# Patient Record
Sex: MALE | Race: WHITE | HISPANIC OR LATINO | Employment: PART TIME | URBAN - METROPOLITAN AREA
[De-identification: names, ages, dates, MRNs, and addresses within clinical notes are randomized per-mention and may not be internally consistent; named-entity substitution may affect disease eponyms.]

---

## 2017-05-11 ENCOUNTER — HOSPITAL ENCOUNTER (EMERGENCY)
Facility: OTHER | Age: 37
Discharge: HOME | End: 2017-05-11
Attending: EMERGENCY MEDICINE

## 2017-05-11 VITALS
SYSTOLIC BLOOD PRESSURE: 118 MMHG | HEART RATE: 58 BPM | DIASTOLIC BLOOD PRESSURE: 69 MMHG | BODY MASS INDEX: 25.01 KG/M2 | WEIGHT: 165 LBS | HEIGHT: 68 IN | TEMPERATURE: 99 F | OXYGEN SATURATION: 96 % | RESPIRATION RATE: 16 BRPM

## 2017-05-11 DIAGNOSIS — G40.909 SEIZURE DISORDER: Primary | ICD-10-CM

## 2017-05-11 DIAGNOSIS — R31.9 HEMATURIA: ICD-10-CM

## 2017-05-11 LAB
ALBUMIN SERPL BCP-MCNC: 5.1 G/DL
ALP SERPL-CCNC: 78 U/L
ALT SERPL W/O P-5'-P-CCNC: 23 U/L
AMPHET+METHAMPHET UR QL: NEGATIVE
ANION GAP SERPL CALC-SCNC: 10 MMOL/L
AST SERPL-CCNC: 23 U/L
BACTERIA #/AREA URNS HPF: ABNORMAL /HPF
BARBITURATES UR QL SCN>200 NG/ML: NEGATIVE
BASOPHILS # BLD AUTO: 0.04 K/UL
BASOPHILS NFR BLD: 0.2 %
BENZODIAZ UR QL SCN>200 NG/ML: NEGATIVE
BILIRUB SERPL-MCNC: 1.5 MG/DL
BILIRUB UR QL STRIP: ABNORMAL
BUN SERPL-MCNC: 27 MG/DL
BZE UR QL SCN: NEGATIVE
CALCIUM SERPL-MCNC: 10.7 MG/DL
CANNABINOIDS UR QL SCN: ABNORMAL
CHLORIDE SERPL-SCNC: 105 MMOL/L
CLARITY UR: ABNORMAL
CO2 SERPL-SCNC: 27 MMOL/L
COLOR UR: YELLOW
CREAT SERPL-MCNC: 1.4 MG/DL
CREAT UR-MCNC: >450 MG/DL
DIFFERENTIAL METHOD: ABNORMAL
EOSINOPHIL # BLD AUTO: 0.2 K/UL
EOSINOPHIL NFR BLD: 0.8 %
ERYTHROCYTE [DISTWIDTH] IN BLOOD BY AUTOMATED COUNT: 11.9 %
EST. GFR  (AFRICAN AMERICAN): >60 ML/MIN/1.73 M^2
EST. GFR  (NON AFRICAN AMERICAN): >60 ML/MIN/1.73 M^2
GLUCOSE SERPL-MCNC: 125 MG/DL
GLUCOSE UR QL STRIP: NEGATIVE
HCT VFR BLD AUTO: 55.2 %
HGB BLD-MCNC: 19 G/DL
HGB UR QL STRIP: ABNORMAL
HYALINE CASTS #/AREA URNS LPF: 1 /LPF
KETONES UR QL STRIP: ABNORMAL
LEUKOCYTE ESTERASE UR QL STRIP: NEGATIVE
LYMPHOCYTES # BLD AUTO: 1.1 K/UL
LYMPHOCYTES NFR BLD: 5 %
MAGNESIUM SERPL-MCNC: 2.3 MG/DL
MCH RBC QN AUTO: 30.7 PG
MCHC RBC AUTO-ENTMCNC: 34.4 %
MCV RBC AUTO: 89 FL
METHADONE UR QL SCN>300 NG/ML: NEGATIVE
MICROSCOPIC COMMENT: ABNORMAL
MONOCYTES # BLD AUTO: 0.9 K/UL
MONOCYTES NFR BLD: 4.3 %
NEUTROPHILS # BLD AUTO: 19.1 K/UL
NEUTROPHILS NFR BLD: 89.3 %
NITRITE UR QL STRIP: NEGATIVE
OPIATES UR QL SCN: NEGATIVE
PCP UR QL SCN>25 NG/ML: NEGATIVE
PH UR STRIP: 6 [PH] (ref 5–8)
PLATELET # BLD AUTO: 247 K/UL
PMV BLD AUTO: 11.4 FL
POTASSIUM SERPL-SCNC: 4.2 MMOL/L
PROT SERPL-MCNC: 9.2 G/DL
PROT UR QL STRIP: ABNORMAL
RBC # BLD AUTO: 6.18 M/UL
RBC #/AREA URNS HPF: 28 /HPF (ref 0–4)
SODIUM SERPL-SCNC: 142 MMOL/L
SP GR UR STRIP: >=1.03 (ref 1–1.03)
TOXICOLOGY INFORMATION: ABNORMAL
URN SPEC COLLECT METH UR: ABNORMAL
UROBILINOGEN UR STRIP-ACNC: NEGATIVE EU/DL
VALPROATE SERPL-MCNC: <12.5 UG/ML
WBC # BLD AUTO: 21.32 K/UL
WBC #/AREA URNS HPF: 2 /HPF (ref 0–5)

## 2017-05-11 PROCEDURE — 96360 HYDRATION IV INFUSION INIT: CPT

## 2017-05-11 PROCEDURE — 80307 DRUG TEST PRSMV CHEM ANLYZR: CPT

## 2017-05-11 PROCEDURE — 80053 COMPREHEN METABOLIC PANEL: CPT

## 2017-05-11 PROCEDURE — 82570 ASSAY OF URINE CREATININE: CPT

## 2017-05-11 PROCEDURE — 80164 ASSAY DIPROPYLACETIC ACD TOT: CPT

## 2017-05-11 PROCEDURE — 96361 HYDRATE IV INFUSION ADD-ON: CPT

## 2017-05-11 PROCEDURE — 83735 ASSAY OF MAGNESIUM: CPT

## 2017-05-11 PROCEDURE — 99283 EMERGENCY DEPT VISIT LOW MDM: CPT | Mod: 25

## 2017-05-11 PROCEDURE — 81000 URINALYSIS NONAUTO W/SCOPE: CPT

## 2017-05-11 PROCEDURE — 25000003 PHARM REV CODE 250: Performed by: EMERGENCY MEDICINE

## 2017-05-11 PROCEDURE — 85025 COMPLETE CBC W/AUTO DIFF WBC: CPT

## 2017-05-11 RX ORDER — DIVALPROEX SODIUM 500 MG/1
500 TABLET, FILM COATED, EXTENDED RELEASE ORAL DAILY
COMMUNITY
End: 2017-05-11

## 2017-05-11 RX ORDER — DIVALPROEX SODIUM 250 MG/1
500 TABLET, DELAYED RELEASE ORAL
Status: COMPLETED | OUTPATIENT
Start: 2017-05-11 | End: 2017-05-11

## 2017-05-11 RX ORDER — FLUOXETINE HYDROCHLORIDE 20 MG/1
20 CAPSULE ORAL DAILY
COMMUNITY

## 2017-05-11 RX ORDER — DIVALPROEX SODIUM 500 MG/1
500 TABLET, DELAYED RELEASE ORAL EVERY 12 HOURS
Qty: 60 TABLET | Refills: 0 | Status: SHIPPED | OUTPATIENT
Start: 2017-05-11 | End: 2018-05-11

## 2017-05-11 RX ADMIN — DIVALPROEX SODIUM 500 MG: 250 TABLET, DELAYED RELEASE ORAL at 10:05

## 2017-05-11 RX ADMIN — SODIUM CHLORIDE 1000 ML: 0.9 INJECTION, SOLUTION INTRAVENOUS at 08:05

## 2017-05-11 NOTE — ED AVS SNAPSHOT
[facility]  [address]  [facility] [address]               Arsalan Glasgow   2017  8:21 PM   ED    Description:  Male : 1980   Department:  [facility]           Your Care was Coordinated By:     Provider Role From To    Paola Warner MD Attending Provider 17 --      Reason for Visit     Seizures           Diagnoses this Visit        Comments    Seizure disorder    -  Primary       ED Disposition     ED Disposition Condition Comment    Discharge             To Do List           Follow-up Information     Follow up with [facility]. Schedule an appointment as soon as possible for a visit in 1 day.    Specialties:  Behavioral Health, Psychiatry    Contact information:    [address] [facility] [address]  [phone]          Follow up with [facility] - Neurology Epilepsy. Call in 1 day.    Specialty:  Neurology    Contact information:    [address]th [address]  [facility] [address] [phone]    Additional information:    7th Floor - [facility]       These Medications        Disp Refills Start End    divalproex (DEPAKOTE) 500 MG TbEC 60 tablet 0 2017    Take 1 tablet (500 mg total) by mouth every 12 (twelve) hours. - Oral      [facility] On Call     [facility] On Call Nurse Care Line -  Assistance  Unless otherwise directed by your provider, please contact [facility], our nurse care line that is available for  assistance.     Registered nurses in the [facility] provide: appointment scheduling, clinical advisement, health education, and other advisory services.  Call: [phone] (toll free)               Medications           START taking these NEW medications        Refills    divalproex (DEPAKOTE) 500 MG TbEC 0    Sig: Take 1 tablet (500 mg total) by mouth every 12 (twelve) hours.    Class: Print    Route: Oral      These medications were  "administered today        Dose Freq    sodium chloride 0.9% bolus 1,000 mL 1,000 mL ED 1 Time    Sig: Inject 1,000 mLs into the vein ED 1 Time.    Class: Normal    Route: Intravenous    divalproex EC tablet 500 mg 500 mg ED 1 Time    Sig: Take 2 tablets (500 mg total) by mouth ED 1 Time.    Class: Normal    Route: Oral      STOP taking these medications     divalproex ER (DEPAKOTE) 500 MG Tb24 Take 500 mg by mouth once daily.           Verify that the below list of medications is an accurate representation of the medications you are currently taking.  If none reported, the list may be blank. If incorrect, please contact your healthcare provider. Carry this list with you in case of emergency.           Current Medications     fluoxetine (PROZAC) 20 MG capsule Take 20 mg by mouth once daily.    divalproex (DEPAKOTE) 500 MG TbEC Take 1 tablet (500 mg total) by mouth every 12 (twelve) hours.    divalproex EC tablet 500 mg Take 2 tablets (500 mg total) by mouth ED 1 Time.           Clinical Reference Information           Your Vitals Were     BP Pulse Temp Resp Height Weight    130/74 64 98.8 °F (37.1 °C) (Oral) 20 5' 8" (1.727 m) 74.8 kg (165 lb)    SpO2 BMI             100% 25.09 kg/m2         Allergies as of 5/11/2017     No Known Allergies      Immunizations Administered on Date of Encounter - 5/11/2017     None      ED Micro, Lab, POCT     Start Ordered       Status Ordering Provider    05/11/17 2026 05/11/17 2025  Valproic Acid  STAT      Final result     05/11/17 2026 05/11/17 2025  Drug screen panel, emergency  STAT      In process     05/11/17 2025 05/11/17 2025  CBC auto differential  STAT      Final result     05/11/17 2025 05/11/17 2025  Comprehensive metabolic panel  STAT      Final result     05/11/17 2025 05/11/17 2025  Magnesium  STAT      Final result     05/11/17 2025 05/11/17 2025  Urinalysis  STAT      In process     05/11/17 2025 05/11/17 2025  Urinalysis Microscopic  Once      In process       ED " Imaging Orders     None        Discharge Instructions         Self-Care for Epilepsy  You can do many things to help control your seizures. First, follow your treatment plan. If your healthcare provider has prescribed medicines, be sure to take them as directed. Also, take the following steps.    Track and avoid triggers  Triggers are things that seem to provoke seizures. Keep track of your triggers and try to avoid them. Here are 2 common triggers and ways to cope with them:  · Too little sleep. Be sure to get enough sleep. If you have trouble sleeping, talk to your healthcare provider.  · Alcohol and drugs. Avoid alcohol. Never take any illegal drugs. If you do have substance abuse issues, seek the help of your healthcare provider for the safest way to become free of alcohol and drugs.   Keep a healthy lifestyle  A healthy lifestyle can help you feel good and cope better with epilepsy.  · Exercise often. Frequent exercise can help keep you healthy. Try to exercise for 30 minutes most days of the week. Yoga is a good choice.  · Eat well and regularly. Good nutrition can give you energy and make you feel better. Eat lots of fruits, vegetables, and whole grains. Avoid skipping meals, because seizures are more likely if you have low blood sugar.  · Control stress. Keeping stress levels low can help you cope better with epilepsy. To manage stress, try an exercise program.  · Manage illness. Get proper treatment when youre sick. Check with your healthcare provider and pharmacist about the risk of seizures with medicines you take for illnesses. If your healthcare provider has prescribed antiepileptic medicines, be sure to take them even when youre ill.  Date Last Reviewed: 9/8/2015  © 8826-5505 [employer]. [address] [place] [address], [place] [address]. All rights reserved. This information is not intended as a substitute for professional medical care. Always follow your healthcare professional's  instructions.          [facility] Sign-Up     Activating your [facility] account is as easy as 1-2-3!     1) Visit [email or web address], select Sign Up Now, enter this activation code and your date of birth, then select Next.  [email or web address]  Expires: 6/25/2017 10:06 PM      2) Create a username and password to use when you visit [facility] in the future and select a security question in case you lose your password and select Next.    3) Enter your e-mail address and click Sign Up!    Additional Information  If you have questions, please e-mail [email or web address] or call [phone] to talk to our [facility] staff. Remember, [facility] is NOT to be used for urgent needs. For medical emergencies, dial 911.          [facility] complies with applicable Federal civil rights laws and does not discriminate on the basis of race, color, national origin, age, disability, or sex.        Language Assistance Services     ATTENTION: Language assistance services are available, free of charge. Please call [phone].      ATENCIÓN: Si habla español, tiene a lopes disposición servicios gratuitos de asistencia lingüística. Llame al [phone].     CHÚ Ý: N?u b?n nói Ti?ng Vi?t, có các d?ch v? h? tr? ngôn ng? mi?n phí dành cho b?n. G?i s? [phone].

## 2017-05-12 NOTE — ED PROVIDER NOTES
Encounter Date: 5/11/2017    SCRIBE #1 NOTE: I, Victor M Elena, am scribing for, and in the presence of, Dr. Warner.       History     Chief Complaint   Patient presents with    Seizures     pt not answering questions in triage, per fiance he has had 4 seizures in the last 1.5 hours; + h/o seizures, compliant with depakote     Review of patient's allergies indicates:  No Known Allergies  HPI Comments: Time seen by provider: 8:25 PM    This is a 37 y.o. male who presents to the ED with a chief complaint of seizure activity. He complains of a generalized HA currently and is unsure if he fell. He states that he took his medications today. He is unsure of how many seizures he experienced. He reports that he was released from the [facility] months ago and does not have a neurologist currently. He states that he has not been sleeping well recently.     His wife states that he called her and told her he was not feeling well this afternoon. She states that he began to experience nausea and vomiting. She states that he experienced 4 seizures within 1.5 hours. She notes that he fell with 3 of them and struck his head with his last seizure. She reports his last one lasted for around 4 minutes. She states that he is prescribed Depakote 500 mg BID but has been intermittently compliant with his meds.     The patient complains of some left sided flank and back pain since his seizures. He denies any dysuria. He notes recent marijuana use, but no alcohol use.   The history is provided by the patient and the spouse.     Past Medical History:   Diagnosis Date    Seizures      History reviewed. No pertinent surgical history.  No family history on file.  Social History   Substance Use Topics    Smoking status: Never Smoker    Smokeless tobacco: None    Alcohol use Yes      Comment: social     Review of Systems   Constitutional: Negative for activity change, appetite change, chills, diaphoresis and fever.    HENT: Negative for congestion, sore throat and trouble swallowing.    Eyes: Negative for photophobia and visual disturbance.   Respiratory: Negative for cough, chest tightness and shortness of breath.    Cardiovascular: Negative for chest pain.   Gastrointestinal: Negative for abdominal pain, nausea and vomiting.   Endocrine: Negative for polydipsia, polyphagia and polyuria.   Genitourinary: Positive for flank pain. Negative for difficulty urinating.   Musculoskeletal: Positive for back pain. Negative for neck pain.   Skin: Negative for pallor.   Neurological: Positive for seizures and headaches. Negative for weakness.   Psychiatric/Behavioral: Negative for confusion.       Physical Exam   Initial Vitals   BP Pulse Resp Temp SpO2   05/11/17 2018 05/11/17 2018 05/11/17 2018 05/11/17 2018 05/11/17 2018   122/80 72 16 94.5 °F (34.7 °C) 98 %     Physical Exam    Nursing note and vitals reviewed.  Constitutional: He appears well-developed and well-nourished. No distress.   HENT:   Head: Normocephalic and atraumatic.   No tongue laceration.    Eyes: Conjunctivae and EOM are normal. Pupils are equal, round, and reactive to light.   Neck: Normal range of motion. Neck supple.   No meningismus.   Cardiovascular: Normal rate and normal heart sounds.   Pulmonary/Chest: Effort normal and breath sounds normal.   Abdominal: Soft. Normal appearance and bowel sounds are normal. There is no tenderness.   Musculoskeletal: Normal range of motion.   Left flank TTP. Healing abrasions present to right shin.    Neurological: He is alert.   Moving all extremities. Mildly post ictal. Slow to respond. Alert to person and place, but not circumstance.    Skin: Skin is warm and dry.   Psychiatric: He has a normal mood and affect. His behavior is normal. Thought content normal.         ED Course   Procedures  Labs Reviewed   CBC W/ AUTO DIFFERENTIAL - Abnormal; Notable for the following:        Result Value    WBC 21.32 (*)     Hemoglobin 19.0  (*)     Hematocrit 55.2 (*)     Gran # 19.1 (*)     Gran% 89.3 (*)     Lymph% 5.0 (*)     All other components within normal limits   COMPREHENSIVE METABOLIC PANEL - Abnormal; Notable for the following:     Glucose 125 (*)     BUN, Bld 27 (*)     Calcium 10.7 (*)     Total Protein 9.2 (*)     Total Bilirubin 1.5 (*)     All other components within normal limits   URINALYSIS - Abnormal; Notable for the following:     Appearance, UA Hazy (*)     Specific Gravity, UA >=1.030 (*)     Protein, UA 1+ (*)     Ketones, UA Trace (*)     Bilirubin (UA) 1+ (*)     Occult Blood UA Trace (*)     All other components within normal limits   VALPROIC ACID - Abnormal; Notable for the following:     Valproic Acid Lvl <12.5 (*)     All other components within normal limits   DRUG SCREEN PANEL, URINE EMERGENCY - Abnormal; Notable for the following:     Creatinine, Random Ur >450.0 (*)     All other components within normal limits   URINALYSIS MICROSCOPIC - Abnormal; Notable for the following:     RBC, UA 28 (*)     Bacteria, UA Moderate (*)     All other components within normal limits   MAGNESIUM             Medical Decision Making:   Initial Assessment:   Urgent evaluation of 37-year-old gentleman with a history of seizure disorder, presenting after multiple witnessed seizures by his fiancée.  Patient is not currently following with a neurologist, endorses she will complaints of his medication.  Patient reportedly had multiple episodes of nonbloody nonbilious emesis today with nausea, and also been under a lot of stress lately.  Patient is post ictal on exam, but appropriate with slow response.  He should has no evidence of head trauma, no evidence of other injury.  We'll plan to evaluate valproic acid level, obtain electrolytes, observe for resolution of post ictal state and reassess.  Clinical Tests:   Lab Tests: Ordered and Reviewed  ED Management:  Patient's temperature improved increasing blankets.  Valproic acid level  subtherapeutic, and patient received by mouth medications here in the emergency Department.  Otherwise labs notable for leukocytosis and hemoconcentration, likely de-margination given no evidence of infection.  Seizures consistent with medication noncompliance, possible lower threshold given cannabis use.  Patient made aware of hematuria findings, and possibility of kidney stone, but given normal creatinine, deferred imaging at this time, and will follow with urology.  Patient return to baseline prior to discharge home, with strict medication compliance education, referral to neurology.            Scribe Attestation:   Scribe #1: I performed the above scribed service and the documentation accurately describes the services I performed. I attest to the accuracy of the note.    Attending Attestation:           Physician Attestation for Scribe:  Physician Attestation Statement for Scribe #1: I, Dr. Warner, reviewed documentation, as scribed by Victor M Parker in my presence, and it is both accurate and complete.                 ED Course     Clinical Impression:     1. Seizure disorder    2. Hematuria          Disposition:   Disposition: Discharged  Condition: Stable       Paola Warner MD  05/11/17 8525

## 2017-05-12 NOTE — DISCHARGE INSTRUCTIONS
Self-Care for Epilepsy  You can do many things to help control your seizures. First, follow your treatment plan. If your healthcare provider has prescribed medicines, be sure to take them as directed. Also, take the following steps.    Track and avoid triggers  Triggers are things that seem to provoke seizures. Keep track of your triggers and try to avoid them. Here are 2 common triggers and ways to cope with them:  · Too little sleep. Be sure to get enough sleep. If you have trouble sleeping, talk to your healthcare provider.  · Alcohol and drugs. Avoid alcohol. Never take any illegal drugs. If you do have substance abuse issues, seek the help of your healthcare provider for the safest way to become free of alcohol and drugs.   Keep a healthy lifestyle  A healthy lifestyle can help you feel good and cope better with epilepsy.  · Exercise often. Frequent exercise can help keep you healthy. Try to exercise for 30 minutes most days of the week. Yoga is a good choice.  · Eat well and regularly. Good nutrition can give you energy and make you feel better. Eat lots of fruits, vegetables, and whole grains. Avoid skipping meals, because seizures are more likely if you have low blood sugar.  · Control stress. Keeping stress levels low can help you cope better with epilepsy. To manage stress, try an exercise program.  · Manage illness. Get proper treatment when youre sick. Check with your healthcare provider and pharmacist about the risk of seizures with medicines you take for illnesses. If your healthcare provider has prescribed antiepileptic medicines, be sure to take them even when youre ill.  Date Last Reviewed: 9/8/2015  © 1860-8841 [employer] [address] [place] [address], [place] [address]. All rights reserved. This information is not intended as a substitute for professional medical care. Always follow your healthcare professional's instructions.